# Patient Record
Sex: FEMALE | Race: WHITE | NOT HISPANIC OR LATINO
[De-identification: names, ages, dates, MRNs, and addresses within clinical notes are randomized per-mention and may not be internally consistent; named-entity substitution may affect disease eponyms.]

---

## 2021-11-05 PROBLEM — Z00.00 ENCOUNTER FOR PREVENTIVE HEALTH EXAMINATION: Status: ACTIVE | Noted: 2021-11-05

## 2021-11-16 ENCOUNTER — APPOINTMENT (OUTPATIENT)
Dept: BREAST CENTER | Facility: CLINIC | Age: 56
End: 2021-11-16
Payer: COMMERCIAL

## 2021-11-16 ENCOUNTER — NON-APPOINTMENT (OUTPATIENT)
Age: 56
End: 2021-11-16

## 2021-11-16 VITALS
BODY MASS INDEX: 29.88 KG/M2 | DIASTOLIC BLOOD PRESSURE: 95 MMHG | HEIGHT: 64 IN | SYSTOLIC BLOOD PRESSURE: 167 MMHG | HEART RATE: 97 BPM | WEIGHT: 175 LBS

## 2021-11-16 DIAGNOSIS — F17.200 NICOTINE DEPENDENCE, UNSPECIFIED, UNCOMPLICATED: ICD-10-CM

## 2021-11-16 DIAGNOSIS — Z83.3 FAMILY HISTORY OF DIABETES MELLITUS: ICD-10-CM

## 2021-11-16 DIAGNOSIS — Z80.3 FAMILY HISTORY OF MALIGNANT NEOPLASM OF BREAST: ICD-10-CM

## 2021-11-16 DIAGNOSIS — Z80.52 FAMILY HISTORY OF MALIGNANT NEOPLASM OF BLADDER: ICD-10-CM

## 2021-11-16 DIAGNOSIS — Z78.9 OTHER SPECIFIED HEALTH STATUS: ICD-10-CM

## 2021-11-16 DIAGNOSIS — Z87.442 PERSONAL HISTORY OF URINARY CALCULI: ICD-10-CM

## 2021-11-16 DIAGNOSIS — Z86.718 PERSONAL HISTORY OF OTHER VENOUS THROMBOSIS AND EMBOLISM: ICD-10-CM

## 2021-11-16 DIAGNOSIS — D48.61 NEOPLASM OF UNCERTAIN BEHAVIOR OF RIGHT BREAST: ICD-10-CM

## 2021-11-16 DIAGNOSIS — N60.12 DIFFUSE CYSTIC MASTOPATHY OF RIGHT BREAST: ICD-10-CM

## 2021-11-16 DIAGNOSIS — N60.11 DIFFUSE CYSTIC MASTOPATHY OF RIGHT BREAST: ICD-10-CM

## 2021-11-16 DIAGNOSIS — Z12.31 ENCOUNTER FOR SCREENING MAMMOGRAM FOR MALIGNANT NEOPLASM OF BREAST: ICD-10-CM

## 2021-11-16 PROCEDURE — 99204 OFFICE O/P NEW MOD 45 MIN: CPT

## 2021-11-16 RX ORDER — FOLIC ACID 20 MG
CAPSULE ORAL
Refills: 0 | Status: ACTIVE | COMMUNITY

## 2021-11-16 RX ORDER — OMEPRAZOLE 20 MG/1
TABLET, DELAYED RELEASE ORAL
Refills: 0 | Status: ACTIVE | COMMUNITY

## 2021-11-16 RX ORDER — RIVAROXABAN 2.5 MG/1
TABLET, FILM COATED ORAL
Refills: 0 | Status: ACTIVE | COMMUNITY

## 2021-11-16 NOTE — CONSULT LETTER
[Dear  ___] : Dear  [unfilled], [( Thank you for referring [unfilled] for consultation for _____ )] : Thank you for referring [unfilled] for consultation for [unfilled] [Please see my note below.] : Please see my note below. [Consult Closing:] : Thank you very much for allowing me to participate in the care of this patient.  If you have any questions, please do not hesitate to contact me. [Sincerely,] : Sincerely, [FreeTextEntry3] : Mayte Dawson MS DO\par Breast Surgeon\par [facility] \[place] [address]\par

## 2021-11-16 NOTE — HISTORY OF PRESENT ILLNESS
[FreeTextEntry1] : This is a 56 year old female referred by Dr. Mcdonald.  She is s/p right breast core needle tissue 11:00 7cm FM 10/21/2021 for a new 2cm mass. Pathology showed benign breast tissue with fibroadenomatoid changes and ductal dilation with focal fat necrosis and was felt to be discordant. She had left leg DVT June 2021 and is currently on xarelto.  She states h/o bilateral breast biopsy in past which were benign. Her last imaging was 2004 in the [place] for which she cannot provide prior imaging for comparison. She has had a lot stress this past year with her fiancee's death Jan 2021 and her mother's fall and rehab/planning for home aides etc.\par \par She does SBE. \par She has not noticed a change in her breast or a breast lump.\par She has not noticed a change in her nipple or nipple area.\par She has not noticed a change in the skin of the breast.\par She is not experiencing nipple discharge.\par She is not experiencing breast pain.\par She has not noticed a lump or lymph node under the armpit. \par \par BREAST CANCER RISK FACTORS\par Menarche: 12\par Date of LMP: 2006\par Menopause: post age 41\par Grav: 0     Para: 0\par Age at first live birth: n/a\par Nursed: n/a \par Hysterectomy: N \par Oophorectomy: N \par OCP: Y in the past at 16 yo for about a year\par HRT: N\par Last pap/pelvic exam: more than 5 years ago \par Related family history: mother breast CA age 74\par Ashkenazi: N\par Mastery risk assessment: BRCAPRO 10.3% TCv7 19.7% TCv8 15.7% Rekha 21.9% Edwar 7.2%\par BRCA testing: niece was tested 2020\par Bra size: 36C\par \par Last mammogram:  10/1/2021              Location: [facility] \par Report reviewed.                                 Images reviewed.\par Results: BIRADS 4\par irregularly lobulated mass in the UO right breast measuring up to 2.8cm in the 11:00 8cm from the nipple , us-guided biopsy is recommended. \par \par Last ultrasound:  10/1/2021                  Location: [facility]\par Report reviewed.                                 Images reviewed. \par Results: BIRADS 4\par suspicious 2cm hypoechoic right breast mass, us guided biopsy is recommended. \par \par Last MRI:                                            Location:\par Report reviewed.

## 2021-11-16 NOTE — REVIEW OF SYSTEMS
[Feeling Tired] : feeling tired [Recent Weight Gain (___ Lbs)] : recent [unfilled] ~Ulb weight gain [Recent Weight Loss (___ Lbs)] : recent [unfilled] ~Ulb weight loss [Eyesight Problems] : eyesight problems [Heart Rate Is Fast] : fast heart rate [Diarrhea] : diarrhea [Limb Pain] : limb pain [Limb Swelling] : limb swelling [Hand Pain] : hand pain [Lower Back Pain] : lower back pain [Mid Back Pain] : mid back pain [Dizziness] : dizziness [Sleep Disturbances] : sleep disturbances [Anxiety] : anxiety [Depression] : depression [Easy Bruising] : a tendency for easy bruising [Negative] : Endocrine

## 2021-11-16 NOTE — ASSESSMENT
[FreeTextEntry1] : 57 yo female with high risk\par reviewed her risk status\par We reviewed risk reduction strategies including maintaining a BMI <25, limiting red meat intake and alcoholic beverages to 3 per week and exercise (150 min/ week low intensity or 75 min/week high intensity). And maintaining a normal vitamin D level.\par \par discussed MRI, she is amenable to this, reviewed risk of false positives\par plan MRI asap\par discussed role of potentially re-bx area once kinetics seen on MRI\par We also reviewed the procedure of localization (magseed) and excision. We reviewed postop care and recovery. We reviewed the risks of infection, bleeding, hematoma, seroma, deformity, scarring and change of sensation particularly in the nipple with possible loss of sensation. I outlined the procedure and what she should expect on the day of surgery. She understands all of the above and her questions have been answered.\par Will discuss with Dr. Mcdonald regarding anticoagulation and preop clearance.\par She knows to call or return sooner should any concerns or questions arise.\par \par

## 2021-11-16 NOTE — PHYSICAL EXAM
[Normocephalic] : normocephalic [Atraumatic] : atraumatic [Supple] : supple [No Supraclavicular Adenopathy] : no supraclavicular adenopathy [Examined in the supine and seated position] : examined in the supine and seated position [Symmetrical] : symmetrical [No dominant masses] : no dominant masses left breast [No Nipple Retraction] : no left nipple retraction [No Nipple Discharge] : no left nipple discharge [No Axillary Lymphadenopathy] : no left axillary lymphadenopathy [No Edema] : no edema [No Rashes] : no rashes [No Ulceration] : no ulceration [de-identified] : in area of patient's concern there is a mass, firm, nontender

## 2021-11-26 ENCOUNTER — TRANSCRIPTION ENCOUNTER (OUTPATIENT)
Age: 56
End: 2021-11-26

## 2021-12-01 ENCOUNTER — NON-APPOINTMENT (OUTPATIENT)
Age: 56
End: 2021-12-01

## 2021-12-01 DIAGNOSIS — R92.8 OTHER ABNORMAL AND INCONCLUSIVE FINDINGS ON DIAGNOSTIC IMAGING OF BREAST: ICD-10-CM

## 2021-12-23 ENCOUNTER — NON-APPOINTMENT (OUTPATIENT)
Age: 56
End: 2021-12-23

## 2022-02-02 ENCOUNTER — NON-APPOINTMENT (OUTPATIENT)
Age: 57
End: 2022-02-02

## 2022-03-03 ENCOUNTER — APPOINTMENT (OUTPATIENT)
Dept: BREAST CENTER | Facility: HOSPITAL | Age: 57
End: 2022-03-03
Payer: COMMERCIAL

## 2022-03-03 PROCEDURE — 14301 TIS TRNFR ANY 30.1-60 SQ CM: CPT

## 2022-03-03 PROCEDURE — 76098 X-RAY EXAM SURGICAL SPECIMEN: CPT | Mod: 26

## 2022-03-03 PROCEDURE — 19125 EXCISION BREAST LESION: CPT | Mod: RT,59

## 2022-03-11 ENCOUNTER — APPOINTMENT (OUTPATIENT)
Dept: BREAST CENTER | Facility: CLINIC | Age: 57
End: 2022-03-11
Payer: COMMERCIAL

## 2022-03-11 DIAGNOSIS — N60.19 DIFFUSE CYSTIC MASTOPATHY OF UNSPECIFIED BREAST: ICD-10-CM

## 2022-03-11 PROCEDURE — 99024 POSTOP FOLLOW-UP VISIT: CPT | Mod: 78

## 2022-03-11 NOTE — HISTORY OF PRESENT ILLNESS
[FreeTextEntry1] : This is a 57 year old female referred by Dr. Mcdonald.  She is s/p right breast core needle tissue 11:00 7cm FM 10/21/2021 for a new 2cm mass. Pathology showed benign breast tissue with fibroadenomatoid changes and ductal dilation with focal fat necrosis and was felt to be discordant. She had left leg DVT June 2021 and is currently on xarelto.  She states h/o bilateral breast biopsy in past which were benign. Her last imaging was 2004 in the [place] for which she cannot provide prior imaging for comparison. She has had a lot stress this past year with her fiancee's death Jan 2021 and her mother's fall and rehab/planning for home aides etc.\par \par She is s/p left MRI biopsy 2-3:00 on 12/15/2021 pathology showed adenosis, CCC, columnar cell hyperplasia, focal UDH, apocrine metaplasia and mild stromal fibrosis. Focal duct ectasia. Microcalcifications. 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 Uninvolved breast tissue with intraductal papilloma, UDH, fibroadenomatoid changes, CCC, and stromal fibrosis, involves anterior, closely approaches superior and inferior  and all others >5mm. Had no post op issues.\par \par She does SBE. \par She has not noticed a change in her breast or a breast lump.\par She has not noticed a change in her nipple or nipple area.\par She has not noticed a change in the skin of the breast.\par She is not experiencing nipple discharge.\par She is not experiencing breast pain.\par She has not noticed a lump or lymph node under the armpit. \par \par BREAST CANCER RISK FACTORS\par Menarche: 12\par Date of LMP: 2006\par Menopause: post age 41\par Grav: 0     Para: 0\par Age at first live birth: n/a\par Nursed: n/a \par Hysterectomy: N \par Oophorectomy: N \par OCP: Y in the past at 18 yo for about a year\par HRT: N\par Last pap/pelvic exam: more than 5 years ago \par Related family history: mother breast CA age 74\par Ashkenazi: N\par Mastery risk assessment: BRCAPRO 10.3% TCv7 19.7% TCv8 15.7% Rekha 21.9% Edwar 7.2%\par BRCA testing: niece was tested 2020\par Bra size: 36C\par \par Last mammogram:  10/1/2021              Location: [facility] \par Report reviewed.                                 Images reviewed.\par Results: BIRADS 4\par irregularly lobulated mass in the UO right breast measuring up to 2.8cm in the 11:00 8cm from the nipple , us-guided biopsy is recommended. \par \par Last ultrasound:  10/1/2021                  Location: [facility]\par Report reviewed.                                 Images reviewed. \par Results: BIRADS 4\par suspicious 2cm hypoechoic right breast mass, us guided biopsy is recommended. \par \par Last MRI: 11/30/2021                                            Location: \par Report reviewed.\par Results: BIRADS 4A\par Indeterminate mass in the right UOQ while this may represent a fibroadenoma. There is a mass with suspicious enhancement kinetics and an adjacent mass in the 2-3:00 axis of the left breast and indeterminate appearance. MRGbx is recommended.

## 2022-03-11 NOTE — ASSESSMENT
[FreeTextEntry1] : doing well\par path reviewed copy given\par rec re-excision of margins anterior/inferior superior\par discussed anticoagulation regimen\par plan RT cs-she prefers [facility] Dr. Jhoana Anton\par f/u next week\par She knows to call or return sooner should any concerns or questions arise.\par

## 2022-03-17 ENCOUNTER — APPOINTMENT (OUTPATIENT)
Dept: BREAST CENTER | Facility: HOSPITAL | Age: 57
End: 2022-03-17
Payer: COMMERCIAL

## 2022-03-17 PROCEDURE — 19301 PARTIAL MASTECTOMY: CPT | Mod: RT,59,58

## 2022-03-17 PROCEDURE — 14301 TIS TRNFR ANY 30.1-60 SQ CM: CPT | Mod: 58

## 2022-03-25 ENCOUNTER — APPOINTMENT (OUTPATIENT)
Dept: BREAST CENTER | Facility: CLINIC | Age: 57
End: 2022-03-25
Payer: COMMERCIAL

## 2022-03-25 PROCEDURE — 99024 POSTOP FOLLOW-UP VISIT: CPT

## 2022-03-25 NOTE — ASSESSMENT
[FreeTextEntry1] : doing well\par path reviewed copy given\par she will see Dr. Mcdonald soon\par plan RT cs with Dr. Anton\par f/u 1 month\par She knows to call or return sooner should any concerns or questions arise.\par

## 2022-03-25 NOTE — CONSULT LETTER
[Dear  ___] : Dear  [unfilled], [Courtesy Letter:] : I had the pleasure of seeing your patient, [unfilled], in my office today. [Please see my note below.] : Please see my note below. [Consult Closing:] : Thank you very much for allowing me to participate in the care of this patient.  If you have any questions, please do not hesitate to contact me. [Sincerely,] : Sincerely, [FreeTextEntry3] : Mayte Dawson MS DO\par Breast Surgeon\par [facility] \[place] [address]\par  [DrJustice  ___] : Dr. ALCARAZ

## 2022-03-25 NOTE — HISTORY OF PRESENT ILLNESS
[FreeTextEntry1] : This is a 57 year old female referred by Dr. Mcdonald.  She is s/p right breast core needle tissue 11:00 7cm FM 10/21/2021 for a new 2cm mass. Pathology showed benign breast tissue with fibroadenomatoid changes and ductal dilation with focal fat necrosis and was felt to be discordant. She had left leg DVT June 2021 and is currently on xarelto.  She states h/o bilateral breast biopsy in past which were benign. Her last imaging was 2004 in the [place] for which she cannot provide prior imaging for comparison. She has had a lot stress this past year with her fiancee's death Jan 2021 and her mother's fall and rehab/planning for home aides etc.\par \par She is s/p left MRI biopsy 2-3:00 on 12/15/2021 pathology showed adenosis, CCC, columnar cell hyperplasia, focal UDH, apocrine metaplasia and mild stromal fibrosis. Focal duct ectasia. Microcalcifications. 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 Uninvolved breast tissue with intraductal papilloma, UDH, fibroadenomatoid changes, CCC, and stromal fibrosis, involves anterior, closely approaches superior and inferior  and all others >5mm. She is s/p re-excision 3/17/2022 path had negative margins and incidenally 3 IM nodes negative. She is doing well.\par \par She does SBE. \par She has not noticed a change in her breast or a breast lump.\par She has not noticed a change in her nipple or nipple area.\par She has not noticed a change in the skin of the breast.\par She is not experiencing nipple discharge.\par She is not experiencing breast pain.\par She has not noticed a lump or lymph node under the armpit. \par \par BREAST CANCER RISK FACTORS\par Menarche: 12\par Date of LMP: 2006\par Menopause: post age 41\par Grav: 0     Para: 0\par Age at first live birth: n/a\par Nursed: n/a \par Hysterectomy: N \par Oophorectomy: N \par OCP: Y in the past at 18 yo for about a year\par HRT: N\par Last pap/pelvic exam: more than 5 years ago \par Related family history: mother breast CA age 74\par Ashkenazi: N\par Mastery risk assessment: BRCAPRO 10.3% TCv7 19.7% TCv8 15.7% Rekha 21.9% Edwar 7.2%\par BRCA testing: niece was tested 2020\par Bra size: 36C\par \par Last mammogram:  10/1/2021              Location: [facility] \par Report reviewed.                                 Images reviewed.\par Results: BIRADS 4\par irregularly lobulated mass in the UO right breast measuring up to 2.8cm in the 11:00 8cm from the nipple , us-guided biopsy is recommended. \par \par Last ultrasound:  10/1/2021                  Location: [facility]\par Report reviewed.                                 Images reviewed. \par Results: BIRADS 4\par suspicious 2cm hypoechoic right breast mass, us guided biopsy is recommended. \par \par Last MRI: 11/30/2021                                            Location: \par Report reviewed.\par Results: BIRADS 4A\par Indeterminate mass in the right UOQ while this may represent a fibroadenoma. There is a mass with suspicious enhancement kinetics and an adjacent mass in the 2-3:00 axis of the left breast and indeterminate appearance. MRGbx is recommended.

## 2022-05-24 ENCOUNTER — APPOINTMENT (OUTPATIENT)
Dept: BREAST CENTER | Facility: CLINIC | Age: 57
End: 2022-05-24
Payer: COMMERCIAL

## 2022-05-24 DIAGNOSIS — D48.61 NEOPLASM OF UNCERTAIN BEHAVIOR OF RIGHT BREAST: ICD-10-CM

## 2022-05-24 PROCEDURE — 99024 POSTOP FOLLOW-UP VISIT: CPT

## 2022-05-24 NOTE — HISTORY OF PRESENT ILLNESS
[FreeTextEntry1] : This is a 57 year old female referred by Dr. Mcdonald.  She is s/p right breast core needle tissue 11:00 7cm FM 10/21/2021 for a new 2cm mass. Pathology showed benign breast tissue with fibroadenomatoid changes and ductal dilation with focal fat necrosis and was felt to be discordant. She had left leg DVT June 2021 and is currently on xarelto.  She states h/o bilateral breast biopsy in past which were benign. Her last imaging was 2004 in the [place] for which she cannot provide prior imaging for comparison. She has had a lot stress this past year with her fiancee's death Jan 2021 and her mother's fall and rehab/planning for home aides etc.\par \par She is s/p left MRI biopsy 2-3:00 on 12/15/2021 pathology showed adenosis, CCC, columnar cell hyperplasia, focal UDH, apocrine metaplasia and mild stromal fibrosis. Focal duct ectasia. Microcalcifications. 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 Uninvolved breast tissue with intraductal papilloma, UDH, fibroadenomatoid changes, CCC, and stromal fibrosis, involves anterior, closely approaches superior and inferior  and all others >5mm. She is s/p re-excision 3/17/2022 path had negative margins and incidenally 3 IM nodes negative. She is doing well. saw Dr. Anton at [facility] and no RT x was recommended.\par \par She does SBE. \par She has not noticed a change in her breast or a breast lump.\par She has not noticed a change in her nipple or nipple area.\par She has not noticed a change in the skin of the breast.\par She is not experiencing nipple discharge.\par She is not experiencing breast pain.\par She has not noticed a lump or lymph node under the armpit. \par \par BREAST CANCER RISK FACTORS\par Menarche: 12\par Date of LMP: 2006\par Menopause: post age 41\par Grav: 0     Para: 0\par Age at first live birth: n/a\par Nursed: n/a \par Hysterectomy: N \par Oophorectomy: N \par OCP: Y in the past at 18 yo for about a year\par HRT: N\par Last pap/pelvic exam: more than 5 years ago \par Related family history: mother breast CA age 74\par Ashkenazi: N\par Mastery risk assessment: BRCAPRO 10.3% TCv7 19.7% TCv8 15.7% Rekha 21.9% Edwar 7.2%\par BRCA testing: niece was tested 2020\par Bra size: 36C\par \par Last mammogram:  10/1/2021              Location: [facility] \par Report reviewed.                                 Images reviewed.\par Results: BIRADS 4\par irregularly lobulated mass in the UO right breast measuring up to 2.8cm in the 11:00 8cm from the nipple , us-guided biopsy is recommended. \par \par Last ultrasound:  10/1/2021                  Location: [facility]\par Report reviewed.                                 Images reviewed. \par Results: BIRADS 4\par suspicious 2cm hypoechoic right breast mass, us guided biopsy is recommended. \par \par Last MRI: 11/30/2021                                            Location: \par Report reviewed.\par Results: BIRADS 4A\par Indeterminate mass in the right UOQ while this may represent a fibroadenoma. There is a mass with suspicious enhancement kinetics and an adjacent mass in the 2-3:00 axis of the left breast and indeterminate appearance. MRGbx is recommended.

## 2022-05-24 NOTE — CONSULT LETTER
[Dear  ___] : Dear  [unfilled], [Courtesy Letter:] : I had the pleasure of seeing your patient, [unfilled], in my office today. [Please see my note below.] : Please see my note below. [Consult Closing:] : Thank you very much for allowing me to participate in the care of this patient.  If you have any questions, please do not hesitate to contact me. [Sincerely,] : Sincerely, [FreeTextEntry3] : Mayte Dawson MS DO\par Breast Surgeon\par [facility] \[place] [address]\par

## 2022-05-24 NOTE — ASSESSMENT
[FreeTextEntry1] : cont surveillance per Dr. Mcdonald\par rec mg/sono OCT 2022\par rec MRI JUN 2022\par f/u 2/2023\par She knows to call or return sooner should any concerns or questions arise.\par

## 2022-05-31 ENCOUNTER — NON-APPOINTMENT (OUTPATIENT)
Age: 57
End: 2022-05-31

## 2023-02-07 ENCOUNTER — APPOINTMENT (OUTPATIENT)
Dept: BREAST CENTER | Facility: CLINIC | Age: 58
End: 2023-02-07